# Patient Record
Sex: FEMALE | Race: WHITE
[De-identification: names, ages, dates, MRNs, and addresses within clinical notes are randomized per-mention and may not be internally consistent; named-entity substitution may affect disease eponyms.]

---

## 2017-12-30 ENCOUNTER — HOSPITAL ENCOUNTER (OUTPATIENT)
Dept: HOSPITAL 92 - ERS | Age: 62
Setting detail: OBSERVATION
LOS: 2 days | Discharge: HOME | End: 2018-01-01
Attending: INTERNAL MEDICINE | Admitting: INTERNAL MEDICINE
Payer: SELF-PAY

## 2017-12-30 VITALS — BODY MASS INDEX: 36 KG/M2

## 2017-12-30 DIAGNOSIS — Z87.11: ICD-10-CM

## 2017-12-30 DIAGNOSIS — E03.9: ICD-10-CM

## 2017-12-30 DIAGNOSIS — E66.01: ICD-10-CM

## 2017-12-30 DIAGNOSIS — I10: ICD-10-CM

## 2017-12-30 DIAGNOSIS — Z90.722: ICD-10-CM

## 2017-12-30 DIAGNOSIS — Z98.890: ICD-10-CM

## 2017-12-30 DIAGNOSIS — Z87.891: ICD-10-CM

## 2017-12-30 DIAGNOSIS — J44.9: ICD-10-CM

## 2017-12-30 DIAGNOSIS — N39.0: ICD-10-CM

## 2017-12-30 DIAGNOSIS — R55: ICD-10-CM

## 2017-12-30 DIAGNOSIS — Z90.79: ICD-10-CM

## 2017-12-30 DIAGNOSIS — Z90.710: ICD-10-CM

## 2017-12-30 DIAGNOSIS — Z86.19: ICD-10-CM

## 2017-12-30 DIAGNOSIS — R42: Primary | ICD-10-CM

## 2017-12-30 DIAGNOSIS — Z79.899: ICD-10-CM

## 2017-12-30 PROCEDURE — G0008 ADMIN INFLUENZA VIRUS VAC: HCPCS

## 2017-12-30 PROCEDURE — 90471 IMMUNIZATION ADMIN: CPT

## 2017-12-30 PROCEDURE — 70551 MRI BRAIN STEM W/O DYE: CPT

## 2017-12-30 PROCEDURE — 85027 COMPLETE CBC AUTOMATED: CPT

## 2017-12-30 PROCEDURE — 87804 INFLUENZA ASSAY W/OPTIC: CPT

## 2017-12-30 PROCEDURE — 85007 BL SMEAR W/DIFF WBC COUNT: CPT

## 2017-12-30 PROCEDURE — 80048 BASIC METABOLIC PNL TOTAL CA: CPT

## 2017-12-30 PROCEDURE — 99285 EMERGENCY DEPT VISIT HI MDM: CPT

## 2017-12-30 PROCEDURE — G0378 HOSPITAL OBSERVATION PER HR: HCPCS

## 2017-12-30 PROCEDURE — 93880 EXTRACRANIAL BILAT STUDY: CPT

## 2017-12-30 PROCEDURE — G0009 ADMIN PNEUMOCOCCAL VACCINE: HCPCS

## 2017-12-30 PROCEDURE — 36415 COLL VENOUS BLD VENIPUNCTURE: CPT

## 2017-12-30 PROCEDURE — 70544 MR ANGIOGRAPHY HEAD W/O DYE: CPT

## 2017-12-30 PROCEDURE — 90682 RIV4 VACC RECOMBINANT DNA IM: CPT

## 2017-12-30 PROCEDURE — 90732 PPSV23 VACC 2 YRS+ SUBQ/IM: CPT

## 2017-12-30 PROCEDURE — 96372 THER/PROPH/DIAG INJ SC/IM: CPT

## 2017-12-30 PROCEDURE — 80061 LIPID PANEL: CPT

## 2017-12-31 LAB
ANION GAP SERPL CALC-SCNC: 11 MMOL/L (ref 10–20)
BUN SERPL-MCNC: 10 MG/DL (ref 9.8–20.1)
CALCIUM SERPL-MCNC: 8.9 MG/DL (ref 7.8–10.44)
CHD RISK SERPL-RTO: 3.1 (ref ?–4.5)
CHLORIDE SERPL-SCNC: 107 MMOL/L (ref 98–107)
CHOLEST SERPL-MCNC: 132 MG/DL
CO2 SERPL-SCNC: 25 MMOL/L (ref 23–31)
CREAT CL PREDICTED SERPL C-G-VRATE: 125 ML/MIN (ref 70–130)
GLUCOSE SERPL-MCNC: 100 MG/DL (ref 80–115)
HDLC SERPL-MCNC: 43 MG/DL
HGB BLD-MCNC: 12.5 G/DL (ref 12–16)
LDLC SERPL CALC-MCNC: 76 MG/DL
MCH RBC QN AUTO: 28.8 PG (ref 27–31)
MCV RBC AUTO: 90.1 FL (ref 81–99)
MDIFF COMPLETE?: YES
PLATELET # BLD AUTO: 149 THOU/UL (ref 130–400)
PLATELET BLD QL SMEAR: (no result)
POTASSIUM SERPL-SCNC: 3.5 MMOL/L (ref 3.5–5.1)
RBC # BLD AUTO: 4.33 MILL/UL (ref 4.2–5.4)
SODIUM SERPL-SCNC: 139 MMOL/L (ref 136–145)
TRIGL SERPL-MCNC: 67 MG/DL (ref ?–150)
WBC # BLD AUTO: 8.3 THOU/UL (ref 4.8–10.8)

## 2017-12-31 NOTE — MRI
MRA BRAIN WITHOUT CONTRAST:

 

HISTORY: 

Dizziness, bilateral lower extremity weakness, and slurred speech.

 

FINDINGS/IMPRESSION:  

The vertebrobasilar and internal carotid artery systems demonstrate good flow without evidence of sig
nificant stenosis, medial branch occlusion, or aneurysm formation.

 

POS: [facility]

## 2017-12-31 NOTE — MRI
MRI BRAIN WITHOUT CONTRAST:

 

HISTORY: 

Slurred speech, dizziness, and bilateral lower extremity weakness.

 

FINDINGS: 

No restricted diffusion is seen.  There are multiple foci of T2 prolongation in the periventricular w
christopher matter consistent with chronic small-vessel ischemic disease.  The ventricular size is appropria
te and the basilar cisterns are patent.  No evidence of infarct, hemorrhage, midline shift, or abnorm
al extraaxial fluid collections are seen.  There is a tiny amount of fluid in the right mastoid air c
ells.  

 

IMPRESSION: 

1.  No evidence of acute intracranial process.  

2.  Chronic small-vessel ischemic disease.

 

POS: [facility]

## 2017-12-31 NOTE — HP
DATE OF ADMISSION:  12/31/2017

 

TIME OF SERVICE:  0130

 

PRIMARY CARE PHYSICIAN:  Dr. Sandhu at [facility].

 

CHIEF COMPLAINT:  Syncope or near syncope.

 

HISTORY OF PRESENT ILLNESS:  Ms. Ruggiero is a 62-year-old female who had a spell earlier today.  She 
was sitting in a chair where at the computer desk, and felt acute onset of spinning sensation.  She d
id not fall, did not sustain a head injury, did not lose consciousness.  She went to the emergency de
partment in [place] and was worked up.  There, they found a urinary tract infection.

 

Post-event, the patient felt very weak and felt like she was not strong enough to support a weight in
 both of her legs.  She said it is kind of felt numb.  When asked if she had any sensory deficit, she
 said no, she said they just felt very heavy.

 

EMS was activated and brought her to the emergency department there in [place].  A CT scan of the b
rain was negative.  Labs were normal, family requested a neurologic evaluation and so she was transfe
rred to us.  Apparently, they tried to transfer to [place], but could not get a call back.

 

Prior to transfer, she was given levofloxacin, Valium, Ativan, meclizine, and Zofran.

 

The patient does describe a sensation of the room spinning around her.  She had no auditory or visual
 hallucinations.  No numbness, tingling or paresthesias unilaterally.  She had some nausea without vo
miting.

 

Post-event, her hands were also shaking while her legs were weak.  She felt that she was very disorie
nted.

 

At the end of the interview, her daughter added that her speech did seem, slurred initially.

 

PAST MEDICAL HISTORY:

1.  COPD.

2.  Peptic ulcer disease.

3.  Hypothyroidism.

4.  Hepatitis C diagnosed in 1993, she was treated with multiple rounds of antivirals including ribav
irin and interferon.  She ultimately was put on Harvoni and as of testing last year was still virus f
ree.

5.  She states she had a liver biopsy in 1993 that was negative for cirrhosis, but had a liver densit
y scan earlier that did suggest some cirrhosis, but does not know the severity.

6.  Hypertension.

 

PAST SURGICAL HISTORY:

1.  Hysterectomy/bilateral salpingo-oophorectomy in 1989.

2.  Exploratory laparotomy for lysis of adhesions for bowel obstruction in 1990 and 1991.

3.  Liver biopsy in 1993.

 

HOME MEDICATIONS:

1.  Albuterol MDI 2 puffs every 4 hours as needed.

2.  Vitamin D plus calcium daily.

 

ALLERGIES:  No known drug allergies.

 

FAMILY HISTORY:  Negative for clotting or bleeding disorder, no immune dysfunction.

 

SOCIAL HISTORY:  She does have a history of past tobacco, smoking 1.5 to 2 packs for about 35 years. 
 She quit 4-5 years ago.  No IV drug use, no alcohol use.

 

I discussed the code status.  She does wish to be a full code.  She also named her daughter, Elieso padilla as her medical decision maker if she is unable to, phone number is [phone].

 

REVIEW OF SYSTEMS:  A 10-point review of systems was performed, negative for all other systems except
 as stated as per HPI.

 

PHYSICAL EXAMINATION:

VITAL SIGNS:  Temperature 98.4, pulse 80, blood pressure 140/84, respiratory 20, O2 sat 93% on room a
ir.

GENERAL:  She is awake.  She is alert.  She is oriented x3.  She seems sleepy, but is awake, but is c
ooperative.

HEENT:  Head is normocephalic and atraumatic.  Pupils equal, round, reactive to light bilaterally.  M
ucous members are moist.  She has no visible lesion, no thrush.

NECK:  Supple.  There is no lymphadenopathy, no JVD, no thyromegaly.  She has normal carotid upstroke
s.  I do not appreciate bruits.

LUNGS:  Clear to auscultation bilaterally without wheezes, rales or rhonchi.

CARDIOVASCULAR:  Normal S1, S2.  No S3 or S4.  No audible murmurs.

ABDOMEN:  Soft, it is nontender, nondistended, no mass, no organomegaly.

EXTREMITIES:  Show no cyanosis, no clubbing with trace pedal edema.

SKIN:  Warm, moist, and well perfused without rashes or lesions.

MUSCULOSKELETAL:  Normal to inspection.  There is no inflammation.  No palpable effusions.

NEUROLOGIC:  Cranial nerves II through XII are grossly intact.  She has normal speech pattern.  She h
as no focal neurologic deficit.  She has 5/5 strength in all of her flexors and extensors of 4 extrem
ities.  Sensation is intact.

 

LABORATORY DATA:  CMP is normal.  Creatinine 0.9, potassium 3.5, sodium 144, calcium 9.3.  Liver func
tions normal.  CBC showed a white count of 7.9, hemoglobin 14.2, hematocrit of 43.1, platelet count 1
64,000 with a normal differential.

 

IMAGING:  CT scan of the brain was negative.

 

ASSESSMENT AND PLAN:

1.  Vertigo.  The patient sounds like she has benign positional vertigo.  Pathophysiology was explain
ed.  She got better with meclizine.  We will continue scheduled meclizine for now.  We will have PT, 
OT evaluate.  In the meantime, we will get a carotid ultrasound, MRI/MRA of the brain.  We will check
 a fasting lipid profile in the morning and start her on aspirin 325 daily.  She likely needs to be o
n statin, but she did not seem excited about taking any extra medications long-term.

2.  Rule out transient ischemic attack.

3.  Chronic obstructive pulmonary disease, without acute exacerbation.  Albuterol MDI as needed.

4.  History of peptic ulcer disease.  Place her on Prevacid p.o. b.i.d. while here.

5.  Hypothyroidism.  TSH 2.5, off of therapy.  We will watch.

6.  History of hepatitis C, it sounds like she is treated.  We will let her follow up with her primar
y doctor for viral load check later.

7.  Hypertension, borderline at present.  I will continue to monitor.

## 2017-12-31 NOTE — ULT
BILATERAL CAROTID DUPLEX ULTRASOUND:

 

HISTORY:

Dizziness.  Slurred speech.  Bilateral upper extremity weakness.

 

TECHNIQUE:

Gray-scale ultrasound with color-flow and spectral Doppler imaging of the extracranial carotid artery
 systems is performed bilaterally.

 

FINDINGS:

There is plaque formation on either side.

 

The peak systolic velocity in the right ICA measures 99 cm per second with an end-diastolic velocity 
of 43 cm per second and a systolic ratio of 1.17.

 

The peak systolic velocity in the left ICA measures 94 cm per second with an end-diastolic velocity o
f 30 cm per second and a systolic ratio of 0.78.

 

Flow in both vertebral arteries remains antegrade.

 

IMPRESSION:

No evidence of hemodynamically significant stenosis.

 

POS: [facility]

## 2018-01-01 VITALS — DIASTOLIC BLOOD PRESSURE: 67 MMHG | SYSTOLIC BLOOD PRESSURE: 142 MMHG

## 2018-01-01 VITALS — TEMPERATURE: 98.3 F

## 2018-01-01 NOTE — DIS
DATE OF ADMISSION:  12/31/2017

 

DATE OF DISCHARGE:  01/01/2018

 

ADMITTING DIAGNOSIS:  Acute dizziness.

 

DISCHARGE DIAGNOSIS:  Acute vertigo, likely benign positional vertigo.

 

SECONDARY DIAGNOSES:

1.  Syncope.

2.  History of urinary tract infection.

3.  History of chronic obstructive pulmonary disease.

4.  History of peptic ulcer disease.

5.  History of moderate dehydration.

 

INVESTIGATIONS DONE DURING THIS ADMISSION:  MRI of the brain was done with no evidence of any acute s
troke was noted.  CT of the head was unremarkable.  MRA of the brain was done, which was unremarkable
, and also on top of this carotid Doppler study was done, which did not show any evidence of carotid 
artery stenosis.

 

HISTORY OF PRESENT ILLNESS AND HOSPITAL COURSE:  In brief, this is a 62-year-old morbidly obese white
 female, who lives on her own.  She came into the hospital as she had a spell where she had a syncopa
l episode while she was sitting on the chair at the computer desk and she felt acute onset of spinnin
g sensation.  She was brought to the ER at [place] and she had a workup done and found to have a U
TI.  After the event, the patient was very weak and she was not strong enough to support her weight o
n her legs, and then EMS was activated and the patient was brought to the ER at [place], and a CT 
scan was done, which was negative.  The family requested higher level of care and was transferred to 
[facility].  When the patient arrived in the ER, she continues to have spinning sensation in
 the room, so an MRI of the brain was ordered, which was unremarkable, which did not show any evidenc
e of acute stroke.  The daughter mentioned that the patient had a slurred speech initially, but the p
atient's speech was normal on the day of the exam.  She had an MRA of the brain, which was also unrem
arkable, and also she had a carotid artery ultrasound, which did not reveal any evidence of carotid a
rtery stenosis.  The patient was started on meclizine, which did help her relieve her symptoms, and t
he patient was having dizziness whenever she was trying to get up from the lying position to sitting 
position.  At multiple occasions, she had this dizzy spells whenever she moves her head.  Most likely
 this was benign positional vertigo symptom.  So, reassured the daughter and advised that she might e
nd up needing an ENT referral from her primary doctor.  The following day the patient's symptoms have
 resolved completely and the patient was able to walk without any difficulty.  Patient also had a neg
ative influenza A and B antigen testing and flu was also ruled out.  The patient was discharged home 
and advised to follow up with the ENT.

 

PHYSICAL EXAMINATION:  On the day of discharge,

VITAL SIGNS:  Blood pressure is 142/67, heart rate is 81, respiratory rate 16, saturation 92% on room
 air.

GENERAL:  The patient is moderately built, moderately nourished.  She does not appear to be in any ac
ricardo distress at this time.

CARDIOVASCULAR:  S1, S2 normal.  No murmurs, no rubs, no gallops.

LUNGS:  Bilateral air entry was equal.  No wheezing, no crackles.

ABDOMEN:  Soft, nontender, no guarding, no rebound tenderness.  Bowel sounds normal.

MUSCULOSKELETAL:  No calf tenderness.  No pedal edema.  No joint tenderness, no joint swelling.

SKIN:  No cyanosis, no erythema, no rash, no pallor.

CRANIAL NERVOUS SYSTEM:  Cranial nerve examination II-XII intact.  No focal deficits were noted at th
is time.

 

DISCHARGE MEDICATIONS:

1.  Albuterol inhalation.

2.  Levofloxacin 250 mg p.o. daily for 5 days.  The patient had a UTI positive at [place], so urin
e cultures are not available at this hospital.

3.  Meclizine 25 mg tablet q.8 hours p.r.n. for dizziness.

 

DISCHARGE INSTRUCTIONS:  Advised to follow up with the primary care physician in 1 week and have him 
refer to an ENT if the patient's symptoms recur.  Advised to continue activity as tolerated.  Increas
e hydration.  Return to the ER if patient has persistent symptoms

 

I spent 30 minutes with this patient.